# Patient Record
Sex: MALE | Race: WHITE | NOT HISPANIC OR LATINO | URBAN - METROPOLITAN AREA
[De-identification: names, ages, dates, MRNs, and addresses within clinical notes are randomized per-mention and may not be internally consistent; named-entity substitution may affect disease eponyms.]

---
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
## 2021-11-02 ENCOUNTER — HOSPITAL ENCOUNTER (EMERGENCY)
Facility: HOSPITAL | Age: 21
Discharge: HOME | End: 2021-11-02
Attending: EMERGENCY MEDICINE | Admitting: EMERGENCY MEDICINE

## 2021-11-02 ENCOUNTER — APPOINTMENT (OUTPATIENT)
Dept: GENERAL RADIOLOGY | Facility: HOSPITAL | Age: 21
End: 2021-11-02

## 2021-11-02 VITALS
SYSTOLIC BLOOD PRESSURE: 153 MMHG | OXYGEN SATURATION: 97 % | HEART RATE: 79 BPM | HEIGHT: 66 IN | RESPIRATION RATE: 16 BRPM | BODY MASS INDEX: 30.53 KG/M2 | WEIGHT: 190 LBS | TEMPERATURE: 98.5 F | DIASTOLIC BLOOD PRESSURE: 80 MMHG

## 2021-11-02 DIAGNOSIS — M23.51 RECURRENT RIGHT KNEE INSTABILITY: Primary | ICD-10-CM

## 2021-11-02 PROCEDURE — 99283 EMERGENCY DEPT VISIT LOW MDM: CPT

## 2021-11-02 PROCEDURE — 99282 EMERGENCY DEPT VISIT SF MDM: CPT | Performed by: PHYSICIAN ASSISTANT

## 2021-11-02 PROCEDURE — 73562 X-RAY EXAM OF KNEE 3: CPT

## 2021-11-02 RX ORDER — IBUPROFEN 400 MG/1
600 TABLET ORAL EVERY 4 HOURS PRN
Status: DISCONTINUED | OUTPATIENT
Start: 2021-11-02 | End: 2021-11-03 | Stop reason: HOSPADM

## 2021-11-02 RX ORDER — DICLOFENAC SODIUM 75 MG/1
75 TABLET, DELAYED RELEASE ORAL 2 TIMES DAILY PRN
Qty: 20 TABLET | Refills: 0 | Status: SHIPPED | OUTPATIENT
Start: 2021-11-02

## 2021-11-02 RX ADMIN — IBUPROFEN 600 MG: 400 TABLET, FILM COATED ORAL at 22:26

## 2021-11-03 NOTE — ED NOTES
Pt presents to the ED with c/o right knee pain after her reports his knee went to the side as he was sitting down. Pt reports a hx of multiple injuries to this knee. Althea Shaw, RN  11/02/21 4758

## 2021-11-03 NOTE — ED NOTES
"Patient c/o pain to right knee and ankle after it \"went out\". Relates pain upon palpation of knee and ankle. Mild swelling noted to right knee.     Sue De La Garza RN  11/02/21 6805    "

## 2021-11-03 NOTE — ED PROVIDER NOTES
" EMERGENCY DEPARTMENT ENCOUNTER      Room Number: 09/09    History is provided by the patient, no translation services needed    HPI:    Chief complaint: Right knee pain    Narrative: Pt is a 21 y.o. male who presents complaining of right knee pain.  Patient reports that he stepped out of his truck he felt his foot go medial in his knee go lateral causing him to fall.  Felt as though \"his knee gave out\".  Reports this is happened a few times over the past few years however today was quite severe and he is having difficulties ambulating on it secondary to pain.  Is worried he might have torn some ligaments in his knee.  Patient denies any previous history of his leg.  Denies any numbness or tingling of the extremity.  Does report hearing a loud popping noise when he bends the knee.      PMD: Provider, No Known    REVIEW OF SYSTEMS  Review of Systems  Complete review of systems performed otherwise negative except pertinent positives per HPI.    PAST MEDICAL HISTORY  Active Ambulatory Problems     Diagnosis Date Noted   • No Active Ambulatory Problems     Resolved Ambulatory Problems     Diagnosis Date Noted   • No Resolved Ambulatory Problems     Past Medical History:   Diagnosis Date   • Bowel obstruction (HCC)    • Eczema    • Heart murmur    • Strep throat        PAST SURGICAL HISTORY  Past Surgical History:   Procedure Laterality Date   • COLON RESECTION     • HAND SURGERY         FAMILY HISTORY  Family History   Problem Relation Age of Onset   • No Known Problems Mother    • No Known Problems Father        SOCIAL HISTORY  Social History     Socioeconomic History   • Marital status: Single   Tobacco Use   • Smoking status: Passive Smoke Exposure - Never Smoker   • Smokeless tobacco: Never Used       ALLERGIES  Patient has no known allergies.    No current facility-administered medications for this encounter.    Current Outpatient Medications:   •  azithromycin (ZITHROMAX Z-JOHNNY) 250 MG tablet, Take 2 tablets the " first day, then 1 tablet daily for 4 days., Disp: 6 tablet, Rfl: 0  •  brompheniramine-pseudoephedrine-DM 30-2-10 MG/5ML syrup, 1-2 teaspoons po q 6 hours prn cough, Disp: 118 mL, Rfl: 0    PHYSICAL EXAM  ED Triage Vitals [11/02/21 2135]   Temp Heart Rate Resp BP SpO2   98.4 °F (36.9 °C) 79 16 171/96 98 %      Temp src Heart Rate Source Patient Position BP Location FiO2 (%)   Oral Monitor Sitting Left arm --       Physical Exam  Vitals and nursing note reviewed.   Constitutional:       Appearance: Normal appearance. He is normal weight.   HENT:      Head: Normocephalic and atraumatic.      Nose: Nose normal.      Mouth/Throat:      Mouth: Mucous membranes are moist.   Eyes:      Extraocular Movements: Extraocular movements intact.      Conjunctiva/sclera: Conjunctivae normal.      Pupils: Pupils are equal, round, and reactive to light.   Cardiovascular:      Rate and Rhythm: Normal rate and regular rhythm.   Pulmonary:      Effort: Pulmonary effort is normal.   Abdominal:      General: Abdomen is flat.      Palpations: Abdomen is soft.   Musculoskeletal:         General: Swelling and tenderness present. No deformity.      Cervical back: Normal range of motion.      Comments: Pain with flexion of right knee and with valgus strain. Effusion present around right knee with maximal tenderness on the medial aspect.  No erythema or overlying rash.   Skin:     General: Skin is warm.      Capillary Refill: Capillary refill takes less than 2 seconds.      Coloration: Skin is not pale.      Findings: No bruising or rash.   Neurological:      General: No focal deficit present.      Mental Status: He is alert and oriented to person, place, and time.   Psychiatric:         Mood and Affect: Mood normal.           LAB RESULTS  Lab Results (last 24 hours)     ** No results found for the last 24 hours. **            I ordered the above labs and reviewed the results    RADIOLOGY  No Radiology Exams Resulted Within Past 24 Hours    I  ordered the above radiologic testing and reviewed the results    PROCEDURES  Procedures      PROGRESS AND CONSULTS           MEDICAL DECISION MAKING    MDM     21-year-old male seen and evaluated for right knee pain.  Patient is able to ambulate into the emergency department however with some difficulties.  As though the knee is very unstable.  On evaluation patient is resting comfortably in bed, has significant pain with flexion and valgus strain of the knee.  There are no significant deformities of the knee therefore I think it is unlikely the patient has sustained an acute fracture or that he has dislocated his kneecap however I do worry about ligamentous injury.  Nonetheless, x-ray will be obtained to rule out acute fracture.  X-ray pending at time of shift change.  Assuming there are no fractures, patient will be placed in a knee immobilizer and given appropriate resources to follow-up with orthopedics.  He can use ibuprofen and Tylenol at home to help with discomfort as well as ice to help with swelling. This was relayed at time of shift change to patient along with Dr. Nugent.      DIAGNOSIS  Final diagnoses:   Knee injury, right, initial encounter       Latest Documented Vital Signs:  As of 22:13 EDT  BP- 171/96 HR- 79 Temp- 98.4 °F (36.9 °C) (Oral) O2 sat- 98%    DISPOSITION    Discussed pertinent findings with the patient/family.  Patient/Family voiced understanding of need to follow-up for recheck and further testing as needed.  Return to the Emergency Department warnings were given.         Medication List      No changes were made to your prescriptions during this visit.              Follow-up Information     Selvin Rubalcava MD. Schedule an appointment as soon as possible for a visit in 1 day.    Specialties: Orthopedic Surgery, Sports Medicine  Why: To schedule follow up appointment  Contact information:  [address]  [place] [address]  [place] [address] [phone]                           Dictated  utilizing Nitesh dictation     Candy Yates PA-C  11/02/21 2894

## 2021-11-03 NOTE — DISCHARGE INSTRUCTIONS
Rest, use the immobilizer and iburprofen for pain and inflammation. Follow up with ortho clinic this week for re-evaluation of your knee.

## 2021-11-18 ENCOUNTER — OFFICE VISIT (OUTPATIENT)
Dept: ORTHOPEDIC SURGERY | Facility: CLINIC | Age: 21
End: 2021-11-18

## 2021-11-18 VITALS
WEIGHT: 216.4 LBS | HEIGHT: 71 IN | BODY MASS INDEX: 30.3 KG/M2 | HEART RATE: 71 BPM | SYSTOLIC BLOOD PRESSURE: 143 MMHG | RESPIRATION RATE: 16 BRPM | DIASTOLIC BLOOD PRESSURE: 82 MMHG

## 2021-11-18 DIAGNOSIS — M25.561 CHRONIC PAIN OF RIGHT KNEE: ICD-10-CM

## 2021-11-18 DIAGNOSIS — M25.361 KNEE INSTABILITY, RIGHT: Primary | ICD-10-CM

## 2021-11-18 DIAGNOSIS — G89.29 CHRONIC PAIN OF RIGHT KNEE: ICD-10-CM

## 2021-11-18 PROCEDURE — 99203 OFFICE O/P NEW LOW 30 MIN: CPT | Performed by: ORTHOPAEDIC SURGERY

## 2021-11-18 NOTE — PROGRESS NOTES
Subjective: Right knee pain and instability     Patient ID: Branden Wise is a 21 y.o. male.    Chief Complaint:    History of Present Illness  21-year-old male seen by me today for the first time regarding his right knee which he states was symptomatic since he was 15.  Apparently was playing ice football at that time he sustained a valgus type injury to the knee when he was kicked.  Is unable to keep playing and had severe pain in the knee.  At that time he is living in [facility] saw a physician, not an orthopedist ordered an MRI which was completed but he never followed up on the results of the MRI for what ever reason.  Persistent having sensation of the knee giving out and instability in 2 to 3 years ago was seen by another physician again not an orthopedist who ordered an MRI.  This was done somewhere locally does not recall where.  Again he did not follow-up with results of that MRI he has had persistent feeling of instability.  States 2 to 3 weeks ago again the knee just gave out valgus type instability and presents here for evaluation.  Was seen in the ER back in the first of this month x-rays were done reported negative and now presents here.  Was placed on Voltaren as an anti-inflammatory but has not gotten the prescription filled.  Complains of pain at rest and with activity she states the knee giving out.  Has not complained of the knee locking in the flexed position.  He has continued to work despite the pain.  He describes the pain is 5 out of 10 achy sharp shooting throbbing type pain discomfort.     Social History     Occupational History   • Not on file   Tobacco Use   • Smoking status: Passive Smoke Exposure - Never Smoker   • Smokeless tobacco: Never Used   Vaping Use   • Vaping Use: Never used   Substance and Sexual Activity   • Alcohol use: Never   • Drug use: Never   • Sexual activity: Defer      Review of Systems   Constitutional: Negative for chills, diaphoresis, fever and unexpected  weight change.   HENT: Negative for hearing loss, nosebleeds, sore throat and tinnitus.    Eyes: Negative for pain and visual disturbance.   Respiratory: Negative for cough, shortness of breath and wheezing.    Cardiovascular: Negative for chest pain and palpitations.   Gastrointestinal: Negative for abdominal pain, diarrhea, nausea and vomiting.   Endocrine: Negative for cold intolerance, heat intolerance and polydipsia.   Genitourinary: Negative for difficulty urinating, dysuria and hematuria.   Musculoskeletal: Positive for arthralgias and myalgias. Negative for joint swelling.   Skin: Negative for rash and wound.   Allergic/Immunologic: Negative for environmental allergies.   Neurological: Negative for dizziness, syncope and numbness.   Hematological: Does not bruise/bleed easily.   Psychiatric/Behavioral: Negative for dysphoric mood and sleep disturbance. The patient is not nervous/anxious.          Past Medical History:   Diagnosis Date   • Bowel obstruction (HCC)    • Eczema    • Heart murmur    • Strep throat      Past Surgical History:   Procedure Laterality Date   • COLON RESECTION     • HAND SURGERY       Family History   Problem Relation Age of Onset   • No Known Problems Mother    • No Known Problems Father          Objective:  Vitals:    11/18/21 1416   BP: 143/82   Pulse: 71   Resp: 16         11/18/21  1416   Weight: 98.2 kg (216 lb 6.4 oz)     Body mass index is 30.18 kg/m².        Ortho Exam   AP and lateral x-rays done at the hospital earlier this month are completely within normal limits showing no acute or chronic changes.  He is alert and oriented x3.  Has normocephalic and sclerae clear.  The right knee today shows no swelling effusion erythema although he states he is having in the past.  The skin is cool to touch.  He has 0 125 degrees of motion with no patellofemoral crepitus with patellar instability.  He does have tenderness along the medial joint line and the MCL.  Quad hamstring  function 5/5.  There is no varus or valgus instability elicited slight tenderness with valgus stressing along the MCL and he has possibly a +1 AP instability on testing.  His calf is nontender.  Is here for 12 no motor or sensory deficit the skin is cool to touch.  He tolerates over-the-counter anti-inflammatories when he takes them with taking nothing on a regular basis.  Again he describes recurrent disabling pain with all activities.    Assessment:        1. Knee instability, right    2. Chronic pain of right knee           Plan: Reviewed with the patient his x-rays history and physical exam.  He is having symptoms of chronic instability of the knee.  He has had 2 MRIs but he does not know where they were done so we can get those images or those reports.  I did advise him to get the Voltaren at the ER prescribed for him filled and started on that but I would order an MRI of that knee and see him back after that has been completed.  Answered all questions            Work Status:    VICTOR M query complete.    Orders:  Orders Placed This Encounter   Procedures   • MRI Knee Right Without Contrast       Medications:  No orders of the defined types were placed in this encounter.      Followup:  Return in about 3 weeks (around 12/9/2021).          Dictated utilizing Dragon dictation

## 2021-12-03 ENCOUNTER — HOSPITAL ENCOUNTER (OUTPATIENT)
Dept: MRI IMAGING | Facility: HOSPITAL | Age: 21
Discharge: HOME | End: 2021-12-03
Admitting: ORTHOPAEDIC SURGERY

## 2021-12-03 DIAGNOSIS — M25.361 KNEE INSTABILITY, RIGHT: ICD-10-CM

## 2021-12-03 PROCEDURE — 73721 MRI JNT OF LWR EXTRE W/O DYE: CPT

## 2021-12-15 ENCOUNTER — OFFICE VISIT (OUTPATIENT)
Dept: ORTHOPEDIC SURGERY | Facility: CLINIC | Age: 21
End: 2021-12-15

## 2021-12-15 VITALS — HEIGHT: 71 IN | WEIGHT: 216 LBS | BODY MASS INDEX: 30.24 KG/M2

## 2021-12-15 DIAGNOSIS — S83.001A PATELLAR SUBLUXATION, RIGHT, INITIAL ENCOUNTER: ICD-10-CM

## 2021-12-15 DIAGNOSIS — M25.361 KNEE INSTABILITY, RIGHT: Primary | ICD-10-CM

## 2021-12-15 PROCEDURE — 99213 OFFICE O/P EST LOW 20 MIN: CPT | Performed by: ORTHOPAEDIC SURGERY

## 2021-12-20 DIAGNOSIS — M25.361 KNEE INSTABILITY, RIGHT: Primary | ICD-10-CM

## 2021-12-20 DIAGNOSIS — S83.001A PATELLAR SUBLUXATION, RIGHT, INITIAL ENCOUNTER: ICD-10-CM

## 2024-02-12 NOTE — PROGRESS NOTES
Subjective: Right knee pain     Patient ID: Branden Wise is a 21 y.o. male.    Chief Complaint:    History of Present Illness patient returns with results of the MRI of the knee which I personally reviewed the images and the report.  Shows no ligamentous or meniscal tears but does show evidence of patella subluxation.  He has a shallow trochlear groove with marrow edema in the anterior lateral femoral condyle indicating bone contusion.  Also has some abnormality of the anterior cortex of the lateral femoral condyle indicating old os chondral injury.       Social History     Occupational History   • Not on file   Tobacco Use   • Smoking status: Passive Smoke Exposure - Never Smoker   • Smokeless tobacco: Never Used   Vaping Use   • Vaping Use: Never used   Substance and Sexual Activity   • Alcohol use: Never   • Drug use: Never   • Sexual activity: Defer      Review of Systems   Constitutional: Negative for chills, diaphoresis, fever and unexpected weight change.   HENT: Negative for hearing loss, nosebleeds, sore throat and tinnitus.    Eyes: Negative for pain and visual disturbance.   Respiratory: Negative for cough, shortness of breath and wheezing.    Cardiovascular: Negative for chest pain and palpitations.   Gastrointestinal: Negative for abdominal pain, diarrhea, nausea and vomiting.   Endocrine: Negative for cold intolerance, heat intolerance and polydipsia.   Genitourinary: Negative for difficulty urinating, dysuria and hematuria.   Musculoskeletal: Positive for arthralgias, joint swelling and myalgias.   Skin: Negative for rash and wound.   Allergic/Immunologic: Negative for environmental allergies.   Neurological: Negative for dizziness, syncope and numbness.   Hematological: Does not bruise/bleed easily.   Psychiatric/Behavioral: Negative for dysphoric mood and sleep disturbance. The patient is not nervous/anxious.          Past Medical History:   Diagnosis Date   • Bowel obstruction (HCC)    • Eczema     • Heart murmur    • Strep throat      Past Surgical History:   Procedure Laterality Date   • COLON RESECTION     • HAND SURGERY       Family History   Problem Relation Age of Onset   • No Known Problems Mother    • No Known Problems Father          Objective:  There were no vitals filed for this visit.      12/15/21  1136   Weight: 98 kg (216 lb)     Body mass index is 30.13 kg/m².        Ortho Exam   He is alert and oriented x3.  The knee has no sign of erythema knee is 0 125 degrees of motion but there is pain along the medial parapatellar retinaculum with a mildly positive apprehension sign.  No instability 09 degrees nor any varus or valgus instability at 10-30 degrees.  He still does have a mild effusion to the knee.  Quad hamstring function is 4 and half out of 5.  The calf is nontender.  Good is a pulses normal sensory deficit.    MRI Knee RT WO     INDICATION:    21-year-old male with right knee pain. Evaluate for internal derangement.     TECHNIQUE:   MRI of the  right knee without IV contrast.     COMPARISON:   X-rays of the right knee dated 11/2/2021, and MRI of the right knee dated 2/6/2019.     FINDINGS:  Ligaments: The anterior and posterior cruciate ligaments are intact. The medial and lateral collateral ligaments are intact.     The extensor mechanism is intact.     Medial meniscus: The medial meniscus is intact.     Lateral meniscus: The lateral meniscus is intact.     Articular cartilage and joint space: Minimal joint effusion. The weightbearing medial and lateral compartment articular cartilage appear intact.     There is mild lateral tilting of the patella, which is seated in an extremely shallow almost flat trochlear groove. The patellar articular cartilage appears intact. The medial and lateral retinacula appear intact.     Bones and soft tissues: Visualized osseous structures are significant for subtle contour x-ray of the anterior lateral femoral condyle just above the weight bearing  surface, which is also present on the prior exam and likely reflects an old osteochondral  injury. There is relatively severe focal thinning of the overlying articular cartilage. There is marrow edema in the anterior aspect of the lateral femoral condyle extending to the subcortical bone. This likely reflects a bone contusion. Osseous injuries  in this location are all often related to patellar dislocation. Marrow edema on the current exam is not as extensive as marrow edema seen on the previous MRI. The remaining osseous structures are unremarkable.     The surrounding soft tissues, as imaged are unremarkable.     IMPRESSION:     1. No evidence of meniscal tear or ligamentous injury.  2. Marrow edema in the anterior lateral femoral condyle extending to the subcortical bone which in the setting of trauma likely represent flecks of bone contusion. Osseous injuries in this location can occur in association with patellar dislocation.  Marrow edema on the current exam is not as extensive as the edema seen on the previous MRI.  3. Contour abnormality in the anterior lateral femoral condyle just above the weight bearing surface likely reflecting an old osteochondral injury. There is relatively severe thinning of the overlying articular cartilage and the appearance is similar to  the prior exam.  4. The  trochlear groove is extremely shallow and almost flat. There is mild lateral tilt to the patella relative to the trochlear groove.     Signer Name: Lyndsay Dorsey MD   Signed: 12/3/2021 5:44 PM   Workstation Name: [id number]    [facility]    Assessment:        1. Knee instability, right    2. Patellar subluxation, right, initial encounter           Plan: Reviewed with the patient at length the results of his MRI.  Apparently the patient has had previously subluxation of the patella and instability as a source of his pain and discomfort.  He has never had a thorough evaluation or treatment for  this problem over the years.  Discussed that in detail and that this is can be treatment physical therapy but is good to take probably a year to fully resolve with that vigorous strengthening program.  I Tamara put him in a patella stabilizing knee sleeve authorize physical therapy in [place] at his request.  He will see Nicolette in 4 weeks.  Continue the anti-inflammatory.  I will see him 4 weeks after that.  I told it is good to be an intensive exercise program to rehab his quad particularly the VMO to resolve the issue and there without contrast his exercise program this will not be successful answered all questions.  Return to see Nicolette in 4 weeks to  his progress and I will see him 4 weeks after that.  Keep him off work today while he ranges for physical therapy can call us to 1 center facility in [place] he wants to do his therapy            Work Status:    VICTOR M query complete.    Orders:  No orders of the defined types were placed in this encounter.      Medications:  No orders of the defined types were placed in this encounter.      Followup:  Return in about 4 weeks (around 1/12/2022).          Dictated utilizing Dragon dictation    29-Mar-2024